# Patient Record
Sex: FEMALE | Race: BLACK OR AFRICAN AMERICAN | Employment: OTHER | URBAN - METROPOLITAN AREA
[De-identification: names, ages, dates, MRNs, and addresses within clinical notes are randomized per-mention and may not be internally consistent; named-entity substitution may affect disease eponyms.]

---

## 2017-11-30 ENCOUNTER — HOSPITAL ENCOUNTER (OUTPATIENT)
Dept: GENERAL RADIOLOGY | Age: 72
Discharge: HOME | End: 2017-11-30
Payer: MEDICARE

## 2017-11-30 DIAGNOSIS — M20.11 ACQUIRED HALLUX VALGUS OF RIGHT FOOT: ICD-10-CM

## 2017-11-30 PROCEDURE — 73630 X-RAY EXAM OF FOOT: CPT

## 2018-10-30 ENCOUNTER — HOSPITAL ENCOUNTER (OUTPATIENT)
Dept: PREADMISSION TESTING | Age: 73
Discharge: HOME | End: 2018-10-30
Payer: MEDICARE

## 2018-10-30 VITALS — BODY MASS INDEX: 35.3 KG/M2 | HEIGHT: 61 IN | WEIGHT: 187 LBS

## 2018-10-30 LAB
ALBUMIN SERPL-MCNC: 3.6 G/DL (ref 3.4–5)
ALBUMIN/GLOB SERPL: 1 {RATIO} (ref 0.8–1.7)
ALP SERPL-CCNC: 91 U/L (ref 45–117)
ALT SERPL-CCNC: 23 U/L (ref 13–56)
ANION GAP SERPL CALC-SCNC: 13 MMOL/L (ref 3–18)
AST SERPL-CCNC: 23 U/L (ref 15–37)
BILIRUB SERPL-MCNC: 0.3 MG/DL (ref 0.2–1)
BUN SERPL-MCNC: 9 MG/DL (ref 7–18)
BUN/CREAT SERPL: 9
CALCIUM SERPL-MCNC: 8.8 MG/DL (ref 8.5–10.1)
CHLORIDE SERPL-SCNC: 107 MMOL/L (ref 100–108)
CO2 SERPL-SCNC: 23 MMOL/L (ref 21–32)
CREAT SERPL-MCNC: 0.95 MG/DL (ref 0.6–1.3)
ERYTHROCYTE [DISTWIDTH] IN BLOOD BY AUTOMATED COUNT: 15.8 % (ref 11.6–14.5)
GLOBULIN SER CALC-MCNC: 3.6 G/DL (ref 2–4)
GLUCOSE SERPL-MCNC: 106 MG/DL (ref 74–99)
HCT VFR BLD AUTO: 36.9 % (ref 35–45)
HGB BLD-MCNC: 10.9 G/DL (ref 12–16)
MCH RBC QN AUTO: 27 PG (ref 24–34)
MCHC RBC AUTO-ENTMCNC: 29.5 G/DL (ref 31–37)
MCV RBC AUTO: 91.6 FL (ref 74–97)
PLATELET # BLD AUTO: 268 K/UL (ref 135–420)
PMV BLD AUTO: 11.3 FL (ref 9.2–11.8)
POTASSIUM SERPL-SCNC: 3.6 MMOL/L (ref 3.5–5.5)
PROT SERPL-MCNC: 7.2 G/DL (ref 6.4–8.2)
RBC # BLD AUTO: 4.03 M/UL (ref 4.2–5.3)
SODIUM SERPL-SCNC: 143 MMOL/L (ref 136–145)
WBC # BLD AUTO: 4.1 K/UL (ref 4.6–13.2)

## 2018-10-30 PROCEDURE — 85027 COMPLETE CBC AUTOMATED: CPT | Performed by: ORTHOPAEDIC SURGERY

## 2018-10-30 PROCEDURE — 80053 COMPREHEN METABOLIC PANEL: CPT | Performed by: ORTHOPAEDIC SURGERY

## 2018-10-30 RX ORDER — LIDOCAINE 50 MG/G
1 PATCH TOPICAL
COMMUNITY

## 2018-10-30 RX ORDER — ACETAMINOPHEN 500 MG
1000 TABLET ORAL
COMMUNITY

## 2018-10-30 RX ORDER — PREGABALIN 150 MG/1
150 CAPSULE ORAL 2 TIMES DAILY WITH MEALS
COMMUNITY

## 2018-10-30 RX ORDER — ASCORBIC ACID 500 MG
500 TABLET ORAL DAILY
COMMUNITY

## 2018-10-30 RX ORDER — DOCUSATE SODIUM 100 MG/1
100 CAPSULE, LIQUID FILLED ORAL
COMMUNITY

## 2018-10-30 RX ORDER — POTASSIUM CHLORIDE 20 MEQ/1
TABLET, EXTENDED RELEASE ORAL DAILY
COMMUNITY
End: 2020-02-05

## 2018-10-30 RX ORDER — OMEPRAZOLE 40 MG/1
40 CAPSULE, DELAYED RELEASE ORAL 2 TIMES DAILY
COMMUNITY

## 2018-10-30 RX ORDER — TRAMADOL HYDROCHLORIDE 50 MG/1
50 TABLET ORAL
COMMUNITY

## 2018-10-30 RX ORDER — PYRIDOXINE HCL (VITAMIN B6) 100 MG
100 TABLET ORAL DAILY
COMMUNITY
End: 2020-02-05

## 2018-10-30 NOTE — PERIOP NOTES
Patient states family physician is aware of upcoming procedure/surgeryChg wipes givenPatient instructed to bring cpap machine in on day of procedure/surgeryDenies family history of anesthesia complications

## 2018-11-02 PROBLEM — M81.0 OSTEOPOROSIS: Chronic | Status: ACTIVE | Noted: 2018-11-02

## 2018-11-02 PROBLEM — J44.9 COPD (CHRONIC OBSTRUCTIVE PULMONARY DISEASE) (HCC): Chronic | Status: ACTIVE | Noted: 2018-11-02

## 2018-11-02 PROBLEM — G56.01 CARPAL TUNNEL SYNDROME OF RIGHT WRIST: Chronic | Status: ACTIVE | Noted: 2018-11-02

## 2018-11-02 PROBLEM — E78.00 HIGH CHOLESTEROL: Chronic | Status: ACTIVE | Noted: 2018-11-02

## 2018-11-02 PROBLEM — D64.9 ANEMIA: Chronic | Status: ACTIVE | Noted: 2018-11-02

## 2018-11-02 PROBLEM — H40.9 GLAUCOMA: Chronic | Status: ACTIVE | Noted: 2018-11-02

## 2018-11-02 PROBLEM — K44.9 HIATAL HERNIA: Chronic | Status: ACTIVE | Noted: 2018-11-02

## 2018-11-02 PROBLEM — J45.909 ASTHMA: Chronic | Status: ACTIVE | Noted: 2018-11-02

## 2018-11-02 PROBLEM — K21.9 GERD (GASTROESOPHAGEAL REFLUX DISEASE): Chronic | Status: ACTIVE | Noted: 2018-11-02

## 2018-11-02 PROBLEM — K27.9 PEPTIC ULCER DISEASE: Chronic | Status: ACTIVE | Noted: 2018-11-02

## 2018-11-02 PROBLEM — G47.30 SLEEP APNEA: Chronic | Status: ACTIVE | Noted: 2018-11-02

## 2018-11-02 PROBLEM — J43.9 EMPHYSEMA LUNG (HCC): Chronic | Status: ACTIVE | Noted: 2018-11-02

## 2018-11-02 PROBLEM — I10 HTN (HYPERTENSION): Chronic | Status: ACTIVE | Noted: 2018-11-02

## 2018-11-02 NOTE — H&P
History and Physical 
 
 
 
Patient: Susan Olmos               Sex: female          DOA: (Not on file) YOB: 1945      Age:  74 y.o.        LOS:  LOS: 0 days HPI:  
 
Bret Linares is in for followup of her right-sided cervical DDD/HNP by cervical MRI/right-hand paresthesias/moderately severe carpal tunnel syndrome as seen by EMG/right shoulder pain with normal shoulder MRI. The patient is in for followup. Dr. Valerie Ledesma has not seen her in about a year. She has had cervical injections of cortisone and shoulder injections, both of which have not affected the complaints she is having in her hand. She still feels the paresthesias in the hand that wake her up at nighttime. She is also complaining of some pain at the base of her thumb with known significant pantrapezial arthritis. AP, lateral, and oblique views of the right hand were obtained and interpreted in the office and reveal significant pantrapezial DJD. Otherwise, no periosteal reaction, no medullary lesions, no osteopenia, well-aligned joint spaces, no chondrolysis, and no fractures. Past Medical History:  
Diagnosis Date  Adverse effect of anesthesia   
 hard to awaken  Arthritis  Asthma  Chronic obstructive pulmonary disease ([address]  Chronic pain   
 lower back, hips and knees  Enlarged thyroid  GERD (gastroesophageal reflux disease)  Hypertension   
 approx 2012  Ill-defined condition   
 glaucoma OU  PUD (peptic ulcer disease) 2009  
 bleeding ulcer  Unspecified sleep apnea   
 intermittent CPAP user Past Surgical History:  
Procedure Laterality Date  HX CATARACT REMOVAL  2011  [address] [facility]  HX LUMBAR LAMINECTOMY  2005  HX OOPHORECTOMY Left 1982  HX ORTHOPAEDIC Right 2007  
 foot and ankle surgery  TOTAL KNEE ARTHROPLASTY Left 2010 No family history on file. Social History Socioeconomic History  Marital status:  Spouse name: Not on file  Number of children: Not on file  Years of education: Not on file  Highest education level: Not on file Social Needs  Financial resource strain: Not on file  Food insecurity - worry: Not on file  Food insecurity - inability: Not on file  Transportation needs - medical: Not on file  Transportation needs - non-medical: Not on file Occupational History  Not on file Tobacco Use  Smoking status: Former Smoker Last attempt to quit: 2007 Years since quittin.8  Smokeless tobacco: Never Used Substance and Sexual Activity  Alcohol use: No  
 Drug use: No  
 Sexual activity: Not on file Other Topics Concern  Not on file Social History Narrative  Not on file Prior to Admission medications Medication Sig Start Date End Date Taking? Authorizing Provider  
pregabalin (LYRICA) 150 mg capsule Take  by mouth Before breakfast and dinner. Provider, Historical  
acetaminophen (TYLENOL EXTRA STRENGTH) 500 mg tablet Take 1,000 mg by mouth daily. Provider, Historical  
tiotropium-olodaterol (STIOLTO RESPIMAT) 2.5-2.5 mcg/actuation mist Take  by inhalation daily. Provider, Historical  
ascorbic acid, vitamin C, (VITAMIN C) 500 mg tablet Take 1,000 mg by mouth daily. Provider, Historical  
pyridoxine, vitamin B6, (VITAMIN B-6) 100 mg tablet Take 100 mg by mouth daily. Provider, Historical  
omeprazole (PRILOSEC) 40 mg capsule Take 40 mg by mouth daily. Provider, Historical  
cartilage/collagen II/hyaluron (MOVE FREE ULTRA PO) Take  by mouth daily. Provider, Historical  
potassium chloride (K-DUR, KLOR-CON) 20 mEq tablet Take  by mouth daily. Provider, Historical  
traMADol (ULTRAM) 50 mg tablet Take 50 mg by mouth every twelve (12) hours as needed for Pain.     Provider, Historical  
docusate sodium (COLACE) 100 mg capsule Take 100 mg by mouth two (2) times daily as needed for Constipation. Provider, Historical  
krill/om-3/dha/epa/phospho/ast (MEGARED OMEGA-3 KRILL OIL PO) Take 350 mg by mouth daily. Provider, Historical  
lidocaine (LIDODERM) 5 % 1 Patch by TransDERmal route every twenty-four (24) hours. Apply patch to the affected area for 12 hours a day and remove for 12 hours a day. Provider, Historical  
ferrous sulfate (IRON) 325 mg (65 mg iron) tablet Take 325 mg by mouth daily. Provider, Historical  
multivitamin (ONE A DAY) tablet Take 1 tablet by mouth daily. Provider, Historical  
b complex vitamins (B COMPLEX 1) tablet Take 1 tablet by mouth daily. Provider, Historical  
diclofenac (VOLTAREN) 1 % topical gel Apply 4 g to affected area four (4) times daily as needed. Provider, Historical  
albuterol (PROVENTIL HFA, VENTOLIN HFA, PROAIR HFA) 90 mcg/actuation inhaler Take 2 Puffs by inhalation every four (4) hours as needed for Wheezing. Provider, Historical  
tiotropium (SPIRIVA WITH HANDIHALER) 18 mcg inhalation capsule Take 1 capsule by inhalation daily. Pt instructed to bring inhaler on DOS. May use inhaler on DOS    Provider, Historical  
TRAVOPROST, BENZALKONIUM, (TRAVATAN OP) Apply 1 Drop to eye nightly. Provider, Historical  
brimonidine (ALPHAGAN P) 0.15 % ophthalmic solution Administer 1 Drop to both eyes two (2) times a day. Provider, Historical  
BETAXOLOL HCL (BETOPTIC S OP) Apply 1 Drop to eye two (2) times a day. Both eyes     Provider, Historical  
calcium-cholecalciferol, D3, (CALTRATE 600+D) tablet Take 1 tablet by mouth two (2) times a day. Provider, Historical  
telmisartan-hydrochlorothiazide (MICARDIS HCT) 40-12.5 mg per tablet Take 1 Tab by mouth daily. Provider, Historical  
atorvastatin (LIPITOR) 20 mg tablet Take 20 mg by mouth nightly. Provider, Historical  
pregabalin (LYRICA) 75 mg capsule Take 75 mg by mouth nightly. Provider, Historical  
 
 
Allergies Allergen Reactions  Diflucan [Fluconazole] Rash  Iodine Nausea and Vomiting Iodine on skin is ok per patient  Neosporin [Benzalkonium Chloride] Swelling Ointment made eyes swell  Nsaids (Non-Steroidal Anti-Inflammatory Drug) Other (comments) H/O bleeding ulcer  Shellfish Derived Nausea and Vomiting  Zocor [Simvastatin] Hives Review of Systems Pertinent positives include joint pain and joint stiffness. Pertinent negatives include chills, fever, weight change, fainting, loss of balance, muscle weakness, numbness/tingling, rheumatoid disease and swelling of feet. Physical Exam:  
  
There were no vitals taken for this visit. Physical Exam: 
Physical exam shows a healthy-appearing elderly  female. The right hand has decreased light-touch sensation in the first three digits. She has a positive Tinel, a positive Phalen's, and a  positive direct carpal compression test and pain at the first [facility] with a positive grind. Otherwise, examination shows that the patients  right wrist demonstrates no obvious swelling, ecchymosis, or wounds noted. The patient has normal motion in all six directions. The patient has a negative Finkelstein maneuver. The patient has good capillary refill. The patient has good  strength of the hand with normal thenar eminence tone and normal light-touch sensation at the tip of each digit. Assessment/Plan Principal Problem: 
  Carpal tunnel syndrome of right wrist (11/2/2018) Active Problems: 
  Emphysema lung ([facility] [address] (11/2/2018) HTN (hypertension) (11/2/2018) Glaucoma (11/2/2018) Asthma (11/2/2018) Anemia (11/2/2018) Osteoporosis (11/2/2018) Sleep apnea (11/2/2018) High cholesterol (11/2/2018) Hiatal hernia (11/2/2018) Peptic ulcer disease (11/2/2018) COPD (chronic obstructive pulmonary disease) ([address] (11/2/2018) GERD (gastroesophageal reflux disease) (11/2/2018) Dr. Leno Jc asked her to follow up again one week post right ECTR. She does have significant health issues, so we are going to try to get this sorted out to see if we can proceed with either a regional or a general anesthesia for her procedure. If they really do not improve either, then Dr. Leno Jc refer her to one of the local hand guys that does the open carpal tunnel release under local anesthesia. Dr. Leno Jc given her five Norco pills for postoperative pain use. Dr. Leno Jc talked to her about the risks of the surgery  including infection, pain and bleeding, and she would be willing to proceed if she can get cleared for surgery.

## 2018-11-07 ENCOUNTER — ANESTHESIA EVENT (OUTPATIENT)
Dept: SURGERY | Age: 73
End: 2018-11-07
Payer: MEDICARE

## 2018-11-07 RX ORDER — SODIUM CHLORIDE 0.9 % (FLUSH) 0.9 %
5-10 SYRINGE (ML) INJECTION AS NEEDED
Status: CANCELLED | OUTPATIENT
Start: 2018-11-07

## 2018-11-07 RX ORDER — DIPHENHYDRAMINE HCL 25 MG
25 CAPSULE ORAL
Status: CANCELLED | OUTPATIENT
Start: 2018-11-07

## 2018-11-07 RX ORDER — SODIUM CHLORIDE 0.9 % (FLUSH) 0.9 %
5-10 SYRINGE (ML) INJECTION EVERY 8 HOURS
Status: CANCELLED | OUTPATIENT
Start: 2018-11-07

## 2018-11-08 ENCOUNTER — HOSPITAL ENCOUNTER (OUTPATIENT)
Age: 73
Setting detail: OUTPATIENT SURGERY
Discharge: HOME | End: 2018-11-08
Attending: ORTHOPAEDIC SURGERY | Admitting: ORTHOPAEDIC SURGERY
Payer: MEDICARE

## 2018-11-08 ENCOUNTER — ANESTHESIA (OUTPATIENT)
Dept: SURGERY | Age: 73
End: 2018-11-08
Payer: MEDICARE

## 2018-11-08 VITALS
SYSTOLIC BLOOD PRESSURE: 107 MMHG | BODY MASS INDEX: 33.95 KG/M2 | DIASTOLIC BLOOD PRESSURE: 67 MMHG | WEIGHT: 184.5 LBS | TEMPERATURE: 97.1 F | HEART RATE: 84 BPM | RESPIRATION RATE: 16 BRPM | HEIGHT: 62 IN | OXYGEN SATURATION: 100 %

## 2018-11-08 PROCEDURE — 77030020782 HC GWN BAIR PAWS FLX 3M -B: Performed by: ORTHOPAEDIC SURGERY

## 2018-11-08 PROCEDURE — 77030009770 HC INSTRU CRPL SET CNMD -C: Performed by: ORTHOPAEDIC SURGERY

## 2018-11-08 PROCEDURE — 76210000016 HC OR PH I REC 1 TO 1.5 HR: Performed by: ORTHOPAEDIC SURGERY

## 2018-11-08 PROCEDURE — 77010033678 HC OXYGEN DAILY

## 2018-11-08 PROCEDURE — 77030032490 HC SLV COMPR SCD KNE COVD -B: Performed by: ORTHOPAEDIC SURGERY

## 2018-11-08 PROCEDURE — 74011000250 HC RX REV CODE- 250: Performed by: ORTHOPAEDIC SURGERY

## 2018-11-08 PROCEDURE — 76010000154 HC OR TIME FIRST 0.5 HR: Performed by: ORTHOPAEDIC SURGERY

## 2018-11-08 PROCEDURE — 74011250636 HC RX REV CODE- 250/636

## 2018-11-08 PROCEDURE — 76060000031 HC ANESTHESIA FIRST 0.5 HR: Performed by: ORTHOPAEDIC SURGERY

## 2018-11-08 PROCEDURE — 76210000021 HC REC RM PH II 0.5 TO 1 HR: Performed by: ORTHOPAEDIC SURGERY

## 2018-11-08 PROCEDURE — 74011250636 HC RX REV CODE- 250/636: Performed by: ORTHOPAEDIC SURGERY

## 2018-11-08 PROCEDURE — 77030000032 HC CUF TRNQT ZIMM -B: Performed by: ORTHOPAEDIC SURGERY

## 2018-11-08 RX ORDER — FENTANYL CITRATE 50 UG/ML
25 INJECTION, SOLUTION INTRAMUSCULAR; INTRAVENOUS AS NEEDED
Status: DISCONTINUED | OUTPATIENT
Start: 2018-11-08 | End: 2018-11-08 | Stop reason: HOSPADM

## 2018-11-08 RX ORDER — LIDOCAINE HYDROCHLORIDE 20 MG/ML
INJECTION, SOLUTION EPIDURAL; INFILTRATION; INTRACAUDAL; PERINEURAL AS NEEDED
Status: DISCONTINUED | OUTPATIENT
Start: 2018-11-08 | End: 2018-11-08 | Stop reason: HOSPADM

## 2018-11-08 RX ORDER — FLUMAZENIL 0.1 MG/ML
0.2 INJECTION INTRAVENOUS
Status: DISCONTINUED | OUTPATIENT
Start: 2018-11-08 | End: 2018-11-08 | Stop reason: HOSPADM

## 2018-11-08 RX ORDER — BUPIVACAINE HYDROCHLORIDE 2.5 MG/ML
INJECTION, SOLUTION EPIDURAL; INFILTRATION; INTRACAUDAL AS NEEDED
Status: DISCONTINUED | OUTPATIENT
Start: 2018-11-08 | End: 2018-11-08 | Stop reason: HOSPADM

## 2018-11-08 RX ORDER — SODIUM CHLORIDE, SODIUM LACTATE, POTASSIUM CHLORIDE, CALCIUM CHLORIDE 600; 310; 30; 20 MG/100ML; MG/100ML; MG/100ML; MG/100ML
1000 INJECTION, SOLUTION INTRAVENOUS CONTINUOUS
Status: DISCONTINUED | OUTPATIENT
Start: 2018-11-08 | End: 2018-11-08 | Stop reason: HOSPADM

## 2018-11-08 RX ORDER — HYDROMORPHONE HYDROCHLORIDE 2 MG/ML
0.5 INJECTION, SOLUTION INTRAMUSCULAR; INTRAVENOUS; SUBCUTANEOUS
Status: DISCONTINUED | OUTPATIENT
Start: 2018-11-08 | End: 2018-11-08 | Stop reason: HOSPADM

## 2018-11-08 RX ORDER — PROPOFOL 10 MG/ML
INJECTION, EMULSION INTRAVENOUS AS NEEDED
Status: DISCONTINUED | OUTPATIENT
Start: 2018-11-08 | End: 2018-11-08 | Stop reason: HOSPADM

## 2018-11-08 RX ORDER — ALBUTEROL SULFATE 0.83 MG/ML
2.5 SOLUTION RESPIRATORY (INHALATION) AS NEEDED
Status: DISCONTINUED | OUTPATIENT
Start: 2018-11-08 | End: 2018-11-08 | Stop reason: HOSPADM

## 2018-11-08 RX ORDER — MIDAZOLAM HYDROCHLORIDE 1 MG/ML
INJECTION, SOLUTION INTRAMUSCULAR; INTRAVENOUS AS NEEDED
Status: DISCONTINUED | OUTPATIENT
Start: 2018-11-08 | End: 2018-11-08 | Stop reason: HOSPADM

## 2018-11-08 RX ORDER — ONDANSETRON 2 MG/ML
INJECTION INTRAMUSCULAR; INTRAVENOUS AS NEEDED
Status: DISCONTINUED | OUTPATIENT
Start: 2018-11-08 | End: 2018-11-08 | Stop reason: HOSPADM

## 2018-11-08 RX ORDER — SODIUM CHLORIDE, SODIUM LACTATE, POTASSIUM CHLORIDE, CALCIUM CHLORIDE 600; 310; 30; 20 MG/100ML; MG/100ML; MG/100ML; MG/100ML
125 INJECTION, SOLUTION INTRAVENOUS CONTINUOUS
Status: DISCONTINUED | OUTPATIENT
Start: 2018-11-08 | End: 2018-11-08 | Stop reason: HOSPADM

## 2018-11-08 RX ORDER — PHENYLEPHRINE HCL IN 0.9% NACL 1 MG/10 ML
SYRINGE (ML) INTRAVENOUS AS NEEDED
Status: DISCONTINUED | OUTPATIENT
Start: 2018-11-08 | End: 2018-11-08 | Stop reason: HOSPADM

## 2018-11-08 RX ORDER — FENTANYL CITRATE 50 UG/ML
INJECTION, SOLUTION INTRAMUSCULAR; INTRAVENOUS AS NEEDED
Status: DISCONTINUED | OUTPATIENT
Start: 2018-11-08 | End: 2018-11-08 | Stop reason: HOSPADM

## 2018-11-08 RX ORDER — SODIUM CHLORIDE 0.9 % (FLUSH) 0.9 %
5-10 SYRINGE (ML) INJECTION AS NEEDED
Status: DISCONTINUED | OUTPATIENT
Start: 2018-11-08 | End: 2018-11-08 | Stop reason: HOSPADM

## 2018-11-08 RX ORDER — SODIUM CHLORIDE, SODIUM LACTATE, POTASSIUM CHLORIDE, CALCIUM CHLORIDE 600; 310; 30; 20 MG/100ML; MG/100ML; MG/100ML; MG/100ML
125 INJECTION, SOLUTION INTRAVENOUS CONTINUOUS
Status: DISCONTINUED | OUTPATIENT
Start: 2018-11-08 | End: 2018-11-08

## 2018-11-08 RX ORDER — NALOXONE HYDROCHLORIDE 0.4 MG/ML
0.2 INJECTION, SOLUTION INTRAMUSCULAR; INTRAVENOUS; SUBCUTANEOUS AS NEEDED
Status: DISCONTINUED | OUTPATIENT
Start: 2018-11-08 | End: 2018-11-08 | Stop reason: HOSPADM

## 2018-11-08 RX ORDER — DIPHENHYDRAMINE HYDROCHLORIDE 50 MG/ML
12.5 INJECTION, SOLUTION INTRAMUSCULAR; INTRAVENOUS
Status: DISCONTINUED | OUTPATIENT
Start: 2018-11-08 | End: 2018-11-08 | Stop reason: HOSPADM

## 2018-11-08 RX ADMIN — FENTANYL CITRATE 50 MCG: 50 INJECTION, SOLUTION INTRAMUSCULAR; INTRAVENOUS at 11:59

## 2018-11-08 RX ADMIN — Medication 100 MCG: at 11:49

## 2018-11-08 RX ADMIN — LIDOCAINE HYDROCHLORIDE 100 MG: 20 INJECTION, SOLUTION EPIDURAL; INFILTRATION; INTRACAUDAL; PERINEURAL at 11:44

## 2018-11-08 RX ADMIN — SODIUM CHLORIDE, SODIUM LACTATE, POTASSIUM CHLORIDE, AND CALCIUM CHLORIDE: 600; 310; 30; 20 INJECTION, SOLUTION INTRAVENOUS at 11:56

## 2018-11-08 RX ADMIN — SODIUM CHLORIDE, SODIUM LACTATE, POTASSIUM CHLORIDE, AND CALCIUM CHLORIDE 125 ML/HR: 600; 310; 30; 20 INJECTION, SOLUTION INTRAVENOUS at 09:41

## 2018-11-08 RX ADMIN — ONDANSETRON 4 MG: 2 INJECTION INTRAMUSCULAR; INTRAVENOUS at 11:49

## 2018-11-08 RX ADMIN — PROPOFOL 150 MG: 10 INJECTION, EMULSION INTRAVENOUS at 11:44

## 2018-11-08 RX ADMIN — MIDAZOLAM HYDROCHLORIDE 2 MG: 1 INJECTION, SOLUTION INTRAMUSCULAR; INTRAVENOUS at 11:40

## 2018-11-08 RX ADMIN — FENTANYL CITRATE 50 MCG: 50 INJECTION, SOLUTION INTRAMUSCULAR; INTRAVENOUS at 11:40

## 2018-11-08 NOTE — ANESTHESIA POSTPROCEDURE EVALUATION
Procedure(s): RIGHT ENDOSCOPIC CARPAL TUNNEL RELEASE, \"SPEC POP\". <BSHSIANPOST> Visit Vitals /57 Pulse 75 Temp 36.1 °C (97 °F) Resp 14 Ht 5' 2\" (1.575 m) Wt 83.7 kg (184 lb 8 oz) SpO2 99% BMI 33.75 kg/m² Post-Anesthesia Evaluation & Assessment No untreated/active PONV Post-operative hydration adequate. Adequate post-operative analgesia per PACU discharge criteria Mental status & level of consciousness: alert and oriented x 3 Respiratory status: patent unassisted airway No apparent anesthetic complications requiring additional post-anesthetic care Patient has met all discharge requirements.  
 
 
 
 
 
Michel Spears MD

## 2018-11-08 NOTE — OP NOTES
Single Incision Endoscopic Carpal Tunnel Release    Patient: Reg Brambila MRN: [id number]  CSN: [id number]   YOB: 1945  Age: 74 y.o. Sex: female      Date of Surgery:11/8/2018    PREOPERATIVE DIAGNOSES:  RIGHT CARPAL TUNNEL SYNDROME      POSTOPERATIVE DIAGNOSES:  RIGHT CARPAL TUNNEL SYNDROME    PROCEDURE: right Single Incision Endoscpoic Carpal Tunnel Release    SURGEON: Jose Luis Karimi MD    SPECIMEN TO PATHOLOGY:  * No specimens in log *    ESTIMATED BLOOD LOSS: none    FINDINGS:  Same     TOURNIQUET  TIME:   Approximately 5 minutes at 057 Hg     COMPLICATIONS:  None     ANESTHESIA:  General    INDICATIONS:  A 74y.o. year old female with known carpal tunnel syndrome documented by clinical exam and nerve conduction studies. The patient now presents for a ECTR. PROCEDURE: The patient was brought into the operating theater and after adequate prepping and draping of the surgical wrist and hand the limb was then exsanguinated with an Esmarch bandage and the tourniquet was inflated. .  A 1 cm transverse incision was placed at the volar flexion wrist crease just ulnar to the palmaris longus. The incision was deepened to the antebrachial fascia which was released the length of the wound and proximally for an additional 1 cm. Using the [facility] carpal tunnel release kit, the carpal tunnel was dilated in line with the fourth metacarpal to the distal extent of the transverse carpal ligament. The cannula was then inserted and kept on some proximal radial deviation with the wrist in slight extension, and it was passed all the way to the cardinal line of Castillo. The scope was then inserted with the wrist in slight extension and the undersurface of the transverse carpal ligament was seen easily from proximal to the distal fatty/adipose tissue at the end of the transverse carpal ligament.   Using the [facility] on the [employer] and using the endoscope for visualization, the transverse carpal ligament was transected in two passes with good release of the ligament outside of view of the cannula. The nerve was never in harms way. The cannula was then withdrawn and placed back in the carpal tunnel with none of the prerelease tension. The wound was then closed with Mastisol on either side and then Steri-Strips were used to re-oppose the skin without the use of stitches. The skin and cut ends of the transverse carpal ligament were anesthetized with 4 to 5 mL of 0.25% Marcaine without epinephrine. This was dressed with two 4 x 4s and an Ace wrap. The tourniquet was deflated and removed, and the patient taken to the recovery room awake and in stable condition. All instrument, sponge and needle counts were correct.     Disha Thomson MD  11/8/2018

## 2018-11-08 NOTE — ANESTHESIA PREPROCEDURE EVALUATION
Anesthetic History No history of anesthetic complications Review of Systems / Medical History Patient summary reviewed, nursing notes reviewed and pertinent labs reviewed Pulmonary COPD Sleep apnea: CPAP Asthma Comments: O2 2/3 lpm 
 Neuro/Psych Within defined limits Cardiovascular Hypertension GI/Hepatic/Renal 
  
GERD: well controlled PUD Endo/Other Arthritis Comments: Goiter Other Findings Physical Exam 
 
Airway Mallampati: III 
TM Distance: 4 - 6 cm Neck ROM: normal range of motion Mouth opening: Normal 
 
 Cardiovascular Regular rate and rhythm,  S1 and S2 normal,  no murmur, click, rub, or gallop Dental 
 
Dentition: Lower partial plate and Upper partial plate Pulmonary Decreased breath sounds: bilateral 
 
 
 
 
 Abdominal 
GI exam deferred Other Findings Anesthetic Plan ASA: 3 Anesthesia type: general 
 
 
 
 
Induction: Intravenous Anesthetic plan and risks discussed with: Patient

## 2018-11-08 NOTE — DISCHARGE INSTRUCTIONS
Nicole Gottlieb III, MD Darrelyn Hau, PA-C    Upper Extremity Surgery  Discharge Instructions      Please take the time to review the following instructions before you leave the hospital and use them as guidelines during your recovery from surgery. If you have any questions you may contact my office at [phone]. Wound Care/Dressing Changes:    []   You may remove the bulky dressing two days after surgery. Once you remove this, no dressing is necessary if there is no drainage. [x]   You may change your dressing as needed. Beginning the 2 days after you are discharged from the hospital you should change your dressing daily. A big, bulky dressing isnt necessary as long as there is any drainage from the incisions. You can put a band-aid or a piece of gauze over each incision and wear an ACE bandage as needed for comfort and swelling. []   Dont remove your dressing or get them wet.  It isnt necessary to apply antibiotic ointment to your incisions. Sutures will be removed at your one week post-op visit. Staples (if you have them) are removed in two weeks. If you have steri-strips over your incision they will start to peel off in 7-10 days as you get them wet. They dont need to be removed prior to that. When they begin to peel off, you may remove them. They should all be removed by 14 days from your surgery. Showering/Bathing:    [x]   You may shower 2 days after your surgery. Your dressing may be removed for showering. You may get your incisions wet in the shower. Dont vigorously scrub your incisions. Apply a clean, dry dressing after you have dried your incisions. Do not take a bath or get into a swimming pool or Jacuzzi until the incisions are completely healed. This may take about 14 days. Do not soak your incision under water. Sling:    [x]   You are not required to wear your sling and should do so only as needed for comfort.  You have no restrictions with regards to the movement of your shoulder. Please push to achieve full range of motion as soon as possible. You may resume your normal daily activities immediately and return to work as soon as you feel appropriate.    []   Keep your arm in the immobilizer at all times except when showering and changing your clothes. When showering or changing, keep your arm at your side. Dont move it away from your body. []   Keep your arm in the immobilizer at all times except when showering, changing your clothes and doing the exercises shown to you by Dr. Axel Melo or your physical therapist prior to your discharge from the hospital.  Keep your arm at your side when changing your clothes and showering. Dont move it away from your body. Ice/Elevation    Continue ice consistently for 48 hours after surgery. After 48 hours, you should ice your shoulder 3 times per day, for 20 minutes at a time for the next 5 days. After one week from surgery, you may use ice as needed for pain and swelling. Diet:    You may advance to your regular diet as tolerated. Medication:    1. You will be given a prescription for pain medication when you are discharged from the hospital.  Take the medication as needed according to the directions on the prescription bottle. Possible side effects of the medication include dizziness, headache, nausea, vomiting, constipation and urinary retention. If you experience any of these side effects call the office so that we can assist you in relieving them. Discontinue the use of the pain medication if you develop itching, rash, shortness of breath or difficulties swallowing. If these symptoms become severe or arent relieved by discontinuing the medication you should seek immediate medical attention. Refills of pain medication are authorized during office hours only (8 AM-5PM Mon. thru Fri.).    2. If you were prescribed Percoset/oxycodone or Dilaudid/hydromorphone you must have a written prescription. These medications legally cannot be called in to the pharmacy. 3. You may take over the counter Ibuprofen/Advil/Aleve between dosages of your pain medication if needed. Do not take Tylenol in addition to your pain medication as most of the pain medication already contains Tylenol. Do not exceed 3000mg of Tylenol per day. Ex: (hydrocodone 5/325g= 325mg of Tylenol)  4. You may resume the medication you were taking prior to surgery. Pain medication may change the effects of any antidepressant medication you are taking. If you have any questions about possible interactions between your regular medications and the pain medication you should consult the physician who prescribes your regular medications. Follow Up Appointment:  If you are unsure of your follow-up appointment date and time, please call [phone]. Please let our  know you are scheduling a post-op appointment. Most appointments should be between 7-14 days after your surgery. Physical Therapy:    []    If you already have a therapy appointment, please be sure to attend your sessions as scheduled. []   Physical Therapy will be discussed with you at your first follow-up appointment with Dr. Sophia Castillo. You dont need to begin physical therapy prior to that.    []  Begin physical therapy with your Home Health Physical Therapy. This will be set up         for your before you leave the hospital.    [x]  You do not require Physical Therapy. Important Signs and Symptoms:    If any of the following signs and symptoms occurs, you should contact Dr. Sophia Castillo office. Please be advised if a problem arises which you feel requires immediate medical attention or you are unable to contact Dr. Sophia Castillo office you should seek immediate medical attention. Signs and symptoms to watch for include:    1.  A sudden increase in swelling and/or redness or warmth at the area your surgery was performed which isnt relieved by rest, ice, and elevation. 2. Oral temperature greater than 101 degrees for 12 hours or more which isnt relieved by an increase in fluid intake and taking two Tylenol every 4-6 hours. 3. Excessive drainage from your incisions, or drainage which hasnt stopped by 72 hours after your surgery. 4. Fever, chills, shortness of breath, chest pain, nausea, vomiting or other signs and symptoms which are of concern to you. DISCHARGE SUMMARY from Nurse    PATIENT INSTRUCTIONS:    After general anesthesia or intravenous sedation, for 24 hours or while taking prescription Narcotics:  · Limit your activities  · Do not drive and operate hazardous machinery  · Do not make important personal or business decisions  · Do  not drink alcoholic beverages  · If you have not urinated within 8 hours after discharge, please contact your surgeon on call. Report the following to your surgeon:  · Excessive pain, swelling, redness or odor of or around the surgical area  · Temperature over 100.5  · Nausea and vomiting lasting longer than 4 hours or if unable to take medications  · Any signs of decreased circulation or nerve impairment to extremity: change in color, persistent  numbness, tingling, coldness or increase pain  · Any questions    What to do at Home:  Recommended activity: No heavy lifting, pushing, pulling with the operative side. If you experience any of the following symptoms fever, chills, uncontrolled pain, uncontrolled vomiting, excessive drainage from incision, please follow up with Dr. Jim Whitt. *  Please give a list of your current medications to your Primary Care Provider. *  Please update this list whenever your medications are discontinued, doses are      changed, or new medications (including over-the-counter products) are added. *  Please carry medication information at all times in case of emergency situations.     These are general instructions for a healthy lifestyle:    No smoking/ No tobacco products/ Avoid exposure to second hand smoke  Surgeon General's Warning:  Quitting smoking now greatly reduces serious risk to your health. Obesity, smoking, and sedentary lifestyle greatly increases your risk for illness    A healthy diet, regular physical exercise & weight monitoring are important for maintaining a healthy lifestyle    You may be retaining fluid if you have a history of heart failure or if you experience any of the following symptoms:  Weight gain of 3 pounds or more overnight or 5 pounds in a week, increased swelling in our hands or feet or shortness of breath while lying flat in bed. Please call your doctor as soon as you notice any of these symptoms; do not wait until your next office visit. Recognize signs and symptoms of STROKE:    F-face looks uneven    A-arms unable to move or move unevenly    S-speech slurred or non-existent    T-time-call 911 as soon as signs and symptoms begin-DO NOT go       Back to bed or wait to see if you get better-TIME IS BRAIN. Warning Signs of HEART ATTACK     Call 911 if you have these symptoms:   Chest discomfort. Most heart attacks involve discomfort in the center of the chest that lasts more than a few minutes, or that goes away and comes back. It can feel like uncomfortable pressure, squeezing, fullness, or pain.  Discomfort in other areas of the upper body. Symptoms can include pain or discomfort in one or both arms, the back, neck, jaw, or stomach.  Shortness of breath with or without chest discomfort.  Other signs may include breaking out in a cold sweat, nausea, or lightheadedness. Don't wait more than five minutes to call 911 - MINUTES MATTER! Fast action can save your life. Calling 911 is almost always the fastest way to get lifesaving treatment. Emergency Medical Services staff can begin treatment when they arrive -- up to an hour sooner than if someone gets to the hospital by car.      The discharge information has been reviewed with the patient and caregiver. The patient and caregiver verbalized understanding. Discharge medications reviewed with the patient and caregiver and appropriate educational materials and side effects teaching were provided.   ___________________________________________________________________________________________________________________________________

## 2018-11-08 NOTE — INTERVAL H&P NOTE
H&P Update: 
Norma Arnold was seen and examined. History and physical has been reviewed. The patient has been examined. There have been no significant clinical changes since the completion of the originally dated History and Physical. 
Patient identified by surgeon; surgical site was confirmed by patient and surgeon.  
 
Signed By: Sohan Rey MD   
 November 8, 2018 10:01 AM

## 2018-11-08 NOTE — PERIOP NOTES
TRANSFER - OUT REPORT: 
 
Verbal report given to Carol MELGAR on Sarah Shields  being transferred to Phase II for routine progression of care Report consisted of patients Situation, Background, Assessment and  
Recommendations(SBAR). Information from the following report(s) SBAR was reviewed with the receiving nurse. Lines:  
Peripheral IV 11/08/18 Left Hand (Active) Site Assessment Clean, dry, & intact 11/8/2018 12:30 PM  
Phlebitis Assessment 0 11/8/2018 12:30 PM  
Infiltration Assessment 0 11/8/2018 12:30 PM  
Dressing Status Clean, dry, & intact 11/8/2018 12:30 PM  
Dressing Type Transparent;Tape 11/8/2018 12:30 PM  
Hub Color/Line Status Pink;Patent; Infusing 11/8/2018 12:30 PM  
 
Visit Vitals /65 Pulse 81 Temp 97 °F (36.1 °C) Resp 16 Ht 5' 2\" (1.575 m) Wt 83.7 kg (184 lb 8 oz) SpO2 97% BMI 33.75 kg/m² Intake/Output Summary (Last 24 hours) at 11/8/2018 1309 Last data filed at 11/8/2018 1308 Gross per 24 hour Intake 1600 ml Output 5 ml Net 1595 ml Opportunity for questions and clarification was provided. Patient transported with: 
 O2 @ 2 liters Tech Savanna Bowman RN

## 2018-11-08 NOTE — PERIOP NOTES
Reviewed PTA medication list with patient/caregiver and patient/caregiver denies any additional medications. Patient admits to having a responsible adult care for them for at least 24 hours after surgery. 
  
Permission granted by patient for a dual skin assessment. Dual skin assessment completed by Nash Jacques RN and Fatou Leary RN.

## 2020-02-04 ENCOUNTER — HOSPITAL ENCOUNTER (OUTPATIENT)
Dept: PREADMISSION TESTING | Age: 75
Discharge: HOME | End: 2020-02-04
Attending: ORTHOPAEDIC SURGERY
Payer: MEDICARE

## 2020-02-04 DIAGNOSIS — M75.41 IMPINGEMENT SYNDROME OF RIGHT SHOULDER: ICD-10-CM

## 2020-02-04 DIAGNOSIS — M75.121 COMPLETE TEAR OF RIGHT ROTATOR CUFF: ICD-10-CM

## 2020-02-04 DIAGNOSIS — Z01.812 BLOOD TESTS PRIOR TO TREATMENT OR PROCEDURE: ICD-10-CM

## 2020-02-04 DIAGNOSIS — Z01.818 OTHER SPECIFIED PRE-OPERATIVE EXAMINATION: ICD-10-CM

## 2020-02-04 LAB
ALBUMIN SERPL-MCNC: 3.6 G/DL (ref 3.4–5)
ALBUMIN/GLOB SERPL: 0.9 {RATIO} (ref 0.8–1.7)
ALP SERPL-CCNC: 96 U/L (ref 45–117)
ALT SERPL-CCNC: 22 U/L (ref 13–56)
ANION GAP SERPL CALC-SCNC: 1 MMOL/L (ref 3–18)
AST SERPL-CCNC: 19 U/L (ref 10–38)
ATRIAL RATE: 90 BPM
BILIRUB SERPL-MCNC: 0.3 MG/DL (ref 0.2–1)
BUN SERPL-MCNC: 10 MG/DL (ref 7–18)
BUN/CREAT SERPL: 11 (ref 12–20)
CALCIUM SERPL-MCNC: 9.3 MG/DL (ref 8.5–10.1)
CALCULATED P AXIS, ECG09: 63 DEGREES
CALCULATED R AXIS, ECG10: 43 DEGREES
CALCULATED T AXIS, ECG11: 32 DEGREES
CHLORIDE SERPL-SCNC: 111 MMOL/L (ref 100–111)
CO2 SERPL-SCNC: 33 MMOL/L (ref 21–32)
CREAT SERPL-MCNC: 0.91 MG/DL (ref 0.6–1.3)
DIAGNOSIS, 93000: NORMAL
ERYTHROCYTE [DISTWIDTH] IN BLOOD BY AUTOMATED COUNT: 15.7 % (ref 11.6–14.5)
GLOBULIN SER CALC-MCNC: 3.9 G/DL (ref 2–4)
GLUCOSE SERPL-MCNC: 113 MG/DL (ref 74–99)
HCT VFR BLD AUTO: 35.6 % (ref 35–45)
HGB BLD-MCNC: 11.1 G/DL (ref 12–16)
MCH RBC QN AUTO: 26.2 PG (ref 24–34)
MCHC RBC AUTO-ENTMCNC: 31.2 G/DL (ref 31–37)
MCV RBC AUTO: 84.2 FL (ref 74–97)
P-R INTERVAL, ECG05: 158 MS
PLATELET # BLD AUTO: 345 K/UL (ref 135–420)
PMV BLD AUTO: 10.3 FL (ref 9.2–11.8)
POTASSIUM SERPL-SCNC: 3.6 MMOL/L (ref 3.5–5.5)
PROT SERPL-MCNC: 7.5 G/DL (ref 6.4–8.2)
Q-T INTERVAL, ECG07: 360 MS
QRS DURATION, ECG06: 72 MS
QTC CALCULATION (BEZET), ECG08: 440 MS
RBC # BLD AUTO: 4.23 M/UL (ref 4.2–5.3)
SODIUM SERPL-SCNC: 145 MMOL/L (ref 136–145)
VENTRICULAR RATE, ECG03: 90 BPM
WBC # BLD AUTO: 4.9 K/UL (ref 4.6–13.2)

## 2020-02-04 PROCEDURE — 85027 COMPLETE CBC AUTOMATED: CPT

## 2020-02-04 PROCEDURE — 93005 ELECTROCARDIOGRAM TRACING: CPT

## 2020-02-04 PROCEDURE — 80053 COMPREHEN METABOLIC PANEL: CPT

## 2020-02-04 PROCEDURE — 36415 COLL VENOUS BLD VENIPUNCTURE: CPT

## 2020-02-18 PROBLEM — M75.111 INCOMPLETE TEAR OF RIGHT ROTATOR CUFF: Chronic | Status: ACTIVE | Noted: 2020-02-18

## 2020-02-18 NOTE — H&P
History and Physical        Patient: Bishop Pacheco               Sex: female          DOA: (Not on file)         YOB: 1945      Age:  66 y.o.        LOS:  LOS: 0 days        HPI:     Efrain Bar is in for followup of her right shoulder bursitis/AC DJD/mild glenohumeral DJD/high-grade rotator cuff tear, as seen by recent MRI. Dr. Yasmine Patel has seen her previously for bilateral third trigger fingers/resolved and bilateral right greater than left moderate first [facility] DJD. The MRI of her shoulder does show a high-grade partial supraspinatus rotator cuff tear. It is more towards the posterior part of the supraspinatus. Past Medical History:   Diagnosis Date    Adverse effect of anesthesia     hard to awaken    Anemia     Arthritis     Asthma     Chronic obstructive pulmonary disease (HCC)     Chronic pain     lower back, hips and knees    Enlarged thyroid     GERD (gastroesophageal reflux disease)     Hypertension     approx 2012    Ill-defined condition     glaucoma OU    PUD (peptic ulcer disease) 2009    bleeding ulcer    Unspecified sleep apnea     CPAP user, Patient instructed to bring CPAP machine on DOS       Past Surgical History:   Procedure Laterality Date    HX CATARACT REMOVAL      OU    HX HYSTERECTOMY  1982    TVH    HX LUMBAR LAMINECTOMY  2005    HX OOPHORECTOMY Left     HX ORTHOPAEDIC Right 2007    foot and ankle surgery    TOTAL KNEE ARTHROPLASTY Left 2010       No family history on file.     Social History     Socioeconomic History    Marital status:      Spouse name: Not on file    Number of children: Not on file    Years of education: Not on file    Highest education level: Not on file   Tobacco Use    Smoking status: Former Smoker     Last attempt to quit:      Years since quittin.1    Smokeless tobacco: Never Used   Substance and Sexual Activity    Alcohol use: No    Drug use: Never    Sexual activity: Not Currently Prior to Admission medications    Medication Sig Start Date End Date Taking? Authorizing Provider   telmisartan (MICARDIS) 40 mg tablet Take 40 mg by mouth daily. Provider, Historical   meclizine (ANTIVERT) 25 mg tablet Take 25 mg by mouth every six (6) hours as needed for Dizziness. Provider, Historical   amoxicillin (AMOXIL) 500 mg capsule Take 2,000 mg by mouth. Prior to dental procedures    Provider, Historical   carboxymethylcellulose sodium (REFRESH OP) Apply 1 Drop to eye four (4) times daily as needed. Both eyes    Provider, Historical   white petrolatum-mineral oil (REFRESH P.M.) 57.3-42.5 % oint ophthalmic ointment Administer  to both eyes nightly as needed. Provider, Historical   OTHER,NON-FORMULARY, CBD oil 1.5 dropper full oral route two times daily    Provider, Historical   pregabalin (LYRICA) 150 mg capsule Take 150 mg by mouth two (2) times daily (with meals). With breakfast and lunch    Provider, Historical   acetaminophen (TYLENOL EXTRA STRENGTH) 500 mg tablet Take 1,000 mg by mouth every six (6) hours as needed. Provider, Historical   tiotropium-olodaterol (STIOLTO RESPIMAT) 2.5-2.5 mcg/actuation mist Take 2 Puffs by inhalation daily. Provider, Historical   ascorbic acid, vitamin C, (VITAMIN C) 500 mg tablet Take 500 mg by mouth daily. Provider, Historical   omeprazole (PRILOSEC) 40 mg capsule Take 40 mg by mouth two (2) times a day. Provider, Historical   cartilage/collagen II/hyaluron (MOVE FREE ULTRA PO) Take 1 Cap by mouth daily. Provider, Historical   traMADol (ULTRAM) 50 mg tablet Take 50 mg by mouth every six (6) hours as needed for Pain. Provider, Historical   docusate sodium (COLACE) 100 mg capsule Take 100 mg by mouth two (2) times daily as needed for Constipation. Provider, Historical   krill/om-3/dha/epa/phospho/ast (MEGARED OMEGA-3 KRILL OIL PO) Take 500 mg by mouth daily.     Provider, Historical   lidocaine (LIDODERM) 5 % 1 Patch by TransDERmal route every twelve (12) hours as needed. Apply patch to the affected area for 12 hours a day and remove for 12 hours a day. Provider, Historical   ferrous sulfate (IRON) 325 mg (65 mg iron) tablet Take 325 mg by mouth daily. Provider, Historical   multivitamin (ONE A DAY) tablet Take 1 tablet by mouth daily. Provider, Historical   b complex vitamins (B COMPLEX 1) tablet Take 1 tablet by mouth daily. Provider, Historical   diclofenac (VOLTAREN) 1 % topical gel Apply 4 g to affected area four (4) times daily as needed. Provider, Historical   albuterol (PROVENTIL HFA, VENTOLIN HFA, PROAIR HFA) 90 mcg/actuation inhaler Take 2 Puffs by inhalation every four (4) hours as needed for Wheezing. Provider, Historical   TRAVOPROST, BENZALKONIUM, (TRAVATAN OP) Apply 1 Drop to eye nightly. Both eyes    Provider, Historical   brimonidine (ALPHAGAN P) 0.15 % ophthalmic solution Administer 1 Drop to both eyes two (2) times a day. Provider, Historical   BETAXOLOL HCL (BETOPTIC S OP) Apply 1 Drop to eye two (2) times a day. Both eyes     Provider, Historical   calcium-cholecalciferol, D3, (CALTRATE 600+D) tablet Take 1 tablet by mouth two (2) times a day. Provider, Historical   atorvastatin (LIPITOR) 20 mg tablet Take 20 mg by mouth nightly. Provider, Historical   pregabalin (LYRICA) 75 mg capsule Take 75 mg by mouth nightly. Provider, Historical       Allergies   Allergen Reactions    Diflucan [Fluconazole] Rash    Iodine Nausea and Vomiting     Iodine on skin is ok per patient    Neosporin [Benzalkonium Chloride] Swelling     Ointment made eyes swell    Nsaids (Non-Steroidal Anti-Inflammatory Drug) Other (comments)     H/O bleeding ulcer    Shellfish Derived Nausea and Vomiting    Zocor [Simvastatin] Hives       Review of Systems  A comprehensive review of systems was negative except for that written in the History of Present Illness.       Physical Exam:      There were no vitals taken for this visit.    Physical Exam:  Examination of the patient's right shoulder shows impingement with total elevation of 180 degrees today. She has pain with cross-over adduction of the right shoulder. She is weak, about 4+/5 with external rotation, as well as supraspinatus testing. Otherwise, the right shoulder has no pain at the [facility] joint or the biceps groove. The skin has no swelling, ecchymosis, or wounds. In the supine position, the patient has no abduction or external rotation apprehension sign and has a negative relocation maneuver. The patient has a negative sulcus sign. There is no pain to palpation anywhere in the shoulder. The patient has good capillary refill, excellent  strength of the hand, and normal light-touch sensation of the hand. Assessment/Plan     Principal Problem:    Incomplete tear of right rotator cuff (2/18/2020)    Active Problems:    DDD (degenerative disc disease), lumbar (11/10/2014)      Emphysema lung ([facility] [address] (11/2/2018)      HTN (hypertension) (11/2/2018)      Glaucoma (11/2/2018)      Asthma (11/2/2018)      Osteoporosis (11/2/2018)      Sleep apnea (11/2/2018)      High cholesterol (11/2/2018)      Hiatal hernia (11/2/2018)      Peptic ulcer disease (11/2/2018)      COPD (chronic obstructive pulmonary disease) ([facility] [address] (11/2/2018)      GERD (gastroesophageal reflux disease) (11/2/2018)      Dr. Good Chambers asked her to follow up again one week postop. He scheduled her for a right shoulder arthroscopy, decompression, distal clavicle excision and arthroscopic rotator cuff repair. He has talked to her about the risks including infection, pain and bleeding, and she is willing to proceed. She has already tried cortisone injections, physical therapy and is not improving and that wakes her up nightly. Dr. Good Chambers does not think she has significant enough arthritis to progress to total shoulder arthroplasty. The patient was issued #30 Roxicodone and five Keflex pills for perioperative use.  We have issued a sling to be used postsurgery. We will see her again 10 days postop. Dr. Jackson Pepper will likely start her on his three week protocol on therapy for her shoulder. She understands all these risks and is willing to proceed.

## 2020-02-19 RX ORDER — SODIUM CHLORIDE 0.9 % (FLUSH) 0.9 %
5-40 SYRINGE (ML) INJECTION AS NEEDED
Status: CANCELLED | OUTPATIENT
Start: 2020-02-19

## 2020-02-19 RX ORDER — SODIUM CHLORIDE 0.9 % (FLUSH) 0.9 %
5-40 SYRINGE (ML) INJECTION EVERY 8 HOURS
Status: CANCELLED | OUTPATIENT
Start: 2020-02-19

## 2020-02-20 ENCOUNTER — ANESTHESIA EVENT (OUTPATIENT)
Dept: SURGERY | Age: 75
End: 2020-02-20
Payer: MEDICARE

## 2020-02-20 ENCOUNTER — ANESTHESIA (OUTPATIENT)
Dept: SURGERY | Age: 75
End: 2020-02-20
Payer: MEDICARE

## 2020-02-20 ENCOUNTER — HOSPITAL ENCOUNTER (OUTPATIENT)
Age: 75
Discharge: HOME | End: 2020-02-20
Attending: ORTHOPAEDIC SURGERY | Admitting: ORTHOPAEDIC SURGERY
Payer: MEDICARE

## 2020-02-20 VITALS
SYSTOLIC BLOOD PRESSURE: 119 MMHG | BODY MASS INDEX: 35.09 KG/M2 | HEIGHT: 62 IN | OXYGEN SATURATION: 98 % | RESPIRATION RATE: 20 BRPM | HEART RATE: 72 BPM | WEIGHT: 190.7 LBS | TEMPERATURE: 98.6 F | DIASTOLIC BLOOD PRESSURE: 79 MMHG

## 2020-02-20 PROBLEM — M75.110 INCOMPLETE ROTATOR CUFF TEAR: Status: ACTIVE | Noted: 2020-02-20

## 2020-02-20 PROCEDURE — 74011000250 HC RX REV CODE- 250: Performed by: SPECIALIST

## 2020-02-20 PROCEDURE — 74011250636 HC RX REV CODE- 250/636: Performed by: PHYSICIAN ASSISTANT

## 2020-02-20 PROCEDURE — 77030016678 HC BUR SHV4 S&N -B: Performed by: ORTHOPAEDIC SURGERY

## 2020-02-20 PROCEDURE — C1713 ANCHOR/SCREW BN/BN,TIS/BN: HCPCS | Performed by: ORTHOPAEDIC SURGERY

## 2020-02-20 PROCEDURE — 77030002922 HC SUT FBRWRE ARTH -B: Performed by: ORTHOPAEDIC SURGERY

## 2020-02-20 PROCEDURE — 77030006884 HC BLD SHV INCIS S&N -B: Performed by: ORTHOPAEDIC SURGERY

## 2020-02-20 PROCEDURE — 74011250636 HC RX REV CODE- 250/636: Performed by: SPECIALIST

## 2020-02-20 PROCEDURE — 77030003598 HC NDL MULT/FIRE ARTH -C: Performed by: ORTHOPAEDIC SURGERY

## 2020-02-20 PROCEDURE — 74011000250 HC RX REV CODE- 250: Performed by: ORTHOPAEDIC SURGERY

## 2020-02-20 PROCEDURE — 77030020782 HC GWN BAIR PAWS FLX 3M -B: Performed by: ORTHOPAEDIC SURGERY

## 2020-02-20 PROCEDURE — 76010000149 HC OR TIME 1 TO 1.5 HR: Performed by: ORTHOPAEDIC SURGERY

## 2020-02-20 PROCEDURE — 77030002933 HC SUT MCRYL J&J -A: Performed by: ORTHOPAEDIC SURGERY

## 2020-02-20 PROCEDURE — 76210000016 HC OR PH I REC 1 TO 1.5 HR: Performed by: ORTHOPAEDIC SURGERY

## 2020-02-20 PROCEDURE — 74011250636 HC RX REV CODE- 250/636: Performed by: ORTHOPAEDIC SURGERY

## 2020-02-20 PROCEDURE — 76210000023 HC REC RM PH II 2 TO 2.5 HR: Performed by: ORTHOPAEDIC SURGERY

## 2020-02-20 PROCEDURE — 77030034478 HC TU IRR ARTHRO PT ARTH -B: Performed by: ORTHOPAEDIC SURGERY

## 2020-02-20 PROCEDURE — 77030022877 HC TU IRR ARTHRO PMP ARTH -B: Performed by: ORTHOPAEDIC SURGERY

## 2020-02-20 PROCEDURE — 77030040361 HC SLV COMPR DVT MDII -B: Performed by: ORTHOPAEDIC SURGERY

## 2020-02-20 PROCEDURE — 77030018835 HC SOL IRR LR ICUM -A: Performed by: ORTHOPAEDIC SURGERY

## 2020-02-20 PROCEDURE — 77030010427: Performed by: ORTHOPAEDIC SURGERY

## 2020-02-20 PROCEDURE — 77030010801: Performed by: ORTHOPAEDIC SURGERY

## 2020-02-20 PROCEDURE — 76060000033 HC ANESTHESIA 1 TO 1.5 HR: Performed by: ORTHOPAEDIC SURGERY

## 2020-02-20 PROCEDURE — 74011250637 HC RX REV CODE- 250/637: Performed by: SPECIALIST

## 2020-02-20 DEVICE — MULTIFIX S-ULTRA 5.5MM KNOTLESS ANCHOR
Type: IMPLANTABLE DEVICE | Site: SHOULDER | Status: FUNCTIONAL
Brand: MULTIFIX

## 2020-02-20 RX ORDER — CEFAZOLIN SODIUM 2 G/50ML
2 SOLUTION INTRAVENOUS ONCE
Status: COMPLETED | OUTPATIENT
Start: 2020-02-20 | End: 2020-02-20

## 2020-02-20 RX ORDER — ONDANSETRON 2 MG/ML
INJECTION INTRAMUSCULAR; INTRAVENOUS AS NEEDED
Status: DISCONTINUED | OUTPATIENT
Start: 2020-02-20 | End: 2020-02-20 | Stop reason: HOSPADM

## 2020-02-20 RX ORDER — SODIUM CHLORIDE 0.9 % (FLUSH) 0.9 %
5-40 SYRINGE (ML) INJECTION AS NEEDED
Status: DISCONTINUED | OUTPATIENT
Start: 2020-02-20 | End: 2020-02-20 | Stop reason: HOSPADM

## 2020-02-20 RX ORDER — SODIUM CHLORIDE, SODIUM LACTATE, POTASSIUM CHLORIDE, CALCIUM CHLORIDE 600; 310; 30; 20 MG/100ML; MG/100ML; MG/100ML; MG/100ML
50 INJECTION, SOLUTION INTRAVENOUS CONTINUOUS
Status: DISCONTINUED | OUTPATIENT
Start: 2020-02-20 | End: 2020-02-20 | Stop reason: HOSPADM

## 2020-02-20 RX ORDER — NALOXONE HYDROCHLORIDE 0.4 MG/ML
0.1 INJECTION, SOLUTION INTRAMUSCULAR; INTRAVENOUS; SUBCUTANEOUS
Status: DISCONTINUED | OUTPATIENT
Start: 2020-02-20 | End: 2020-02-20 | Stop reason: HOSPADM

## 2020-02-20 RX ORDER — DEXAMETHASONE SODIUM PHOSPHATE 4 MG/ML
INJECTION, SOLUTION INTRA-ARTICULAR; INTRALESIONAL; INTRAMUSCULAR; INTRAVENOUS; SOFT TISSUE AS NEEDED
Status: DISCONTINUED | OUTPATIENT
Start: 2020-02-20 | End: 2020-02-20 | Stop reason: HOSPADM

## 2020-02-20 RX ORDER — ONDANSETRON 2 MG/ML
4 INJECTION INTRAMUSCULAR; INTRAVENOUS ONCE
Status: COMPLETED | OUTPATIENT
Start: 2020-02-20 | End: 2020-02-20

## 2020-02-20 RX ORDER — SODIUM CHLORIDE, SODIUM LACTATE, POTASSIUM CHLORIDE, CALCIUM CHLORIDE 600; 310; 30; 20 MG/100ML; MG/100ML; MG/100ML; MG/100ML
125 INJECTION, SOLUTION INTRAVENOUS CONTINUOUS
Status: DISCONTINUED | OUTPATIENT
Start: 2020-02-20 | End: 2020-02-20

## 2020-02-20 RX ORDER — FENTANYL CITRATE 50 UG/ML
25 INJECTION, SOLUTION INTRAMUSCULAR; INTRAVENOUS
Status: DISCONTINUED | OUTPATIENT
Start: 2020-02-20 | End: 2020-02-20 | Stop reason: HOSPADM

## 2020-02-20 RX ORDER — PROPOFOL 10 MG/ML
INJECTION, EMULSION INTRAVENOUS AS NEEDED
Status: DISCONTINUED | OUTPATIENT
Start: 2020-02-20 | End: 2020-02-20 | Stop reason: HOSPADM

## 2020-02-20 RX ORDER — HYDROMORPHONE HYDROCHLORIDE 2 MG/ML
0.5 INJECTION, SOLUTION INTRAMUSCULAR; INTRAVENOUS; SUBCUTANEOUS
Status: COMPLETED | OUTPATIENT
Start: 2020-02-20 | End: 2020-02-20

## 2020-02-20 RX ORDER — SODIUM CHLORIDE, SODIUM LACTATE, POTASSIUM CHLORIDE, CALCIUM CHLORIDE 600; 310; 30; 20 MG/100ML; MG/100ML; MG/100ML; MG/100ML
125 INJECTION, SOLUTION INTRAVENOUS CONTINUOUS
Status: DISCONTINUED | OUTPATIENT
Start: 2020-02-20 | End: 2020-02-20 | Stop reason: HOSPADM

## 2020-02-20 RX ORDER — ACETAMINOPHEN 500 MG
1000 TABLET ORAL ONCE
Status: COMPLETED | OUTPATIENT
Start: 2020-02-20 | End: 2020-02-20

## 2020-02-20 RX ORDER — FENTANYL CITRATE 50 UG/ML
INJECTION, SOLUTION INTRAMUSCULAR; INTRAVENOUS AS NEEDED
Status: DISCONTINUED | OUTPATIENT
Start: 2020-02-20 | End: 2020-02-20 | Stop reason: HOSPADM

## 2020-02-20 RX ORDER — SODIUM CHLORIDE 0.9 % (FLUSH) 0.9 %
5-40 SYRINGE (ML) INJECTION EVERY 8 HOURS
Status: DISCONTINUED | OUTPATIENT
Start: 2020-02-20 | End: 2020-02-20 | Stop reason: HOSPADM

## 2020-02-20 RX ORDER — LIDOCAINE HYDROCHLORIDE 20 MG/ML
INJECTION, SOLUTION EPIDURAL; INFILTRATION; INTRACAUDAL; PERINEURAL AS NEEDED
Status: DISCONTINUED | OUTPATIENT
Start: 2020-02-20 | End: 2020-02-20 | Stop reason: HOSPADM

## 2020-02-20 RX ADMIN — FENTANYL CITRATE 25 MCG: 50 INJECTION, SOLUTION INTRAMUSCULAR; INTRAVENOUS at 09:12

## 2020-02-20 RX ADMIN — HYDROMORPHONE HYDROCHLORIDE 0.5 MG: 2 INJECTION, SOLUTION INTRAMUSCULAR; INTRAVENOUS; SUBCUTANEOUS at 10:30

## 2020-02-20 RX ADMIN — LIDOCAINE HYDROCHLORIDE 60 MG: 20 INJECTION, SOLUTION EPIDURAL; INFILTRATION; INTRACAUDAL; PERINEURAL at 08:50

## 2020-02-20 RX ADMIN — HYDROMORPHONE HYDROCHLORIDE 0.5 MG: 2 INJECTION, SOLUTION INTRAMUSCULAR; INTRAVENOUS; SUBCUTANEOUS at 10:40

## 2020-02-20 RX ADMIN — DEXAMETHASONE SODIUM PHOSPHATE 4 MG: 4 INJECTION, SOLUTION INTRAMUSCULAR; INTRAVENOUS at 09:03

## 2020-02-20 RX ADMIN — FENTANYL CITRATE 25 MCG: 50 INJECTION, SOLUTION INTRAMUSCULAR; INTRAVENOUS at 09:46

## 2020-02-20 RX ADMIN — HYDROMORPHONE HYDROCHLORIDE 0.5 MG: 2 INJECTION, SOLUTION INTRAMUSCULAR; INTRAVENOUS; SUBCUTANEOUS at 10:10

## 2020-02-20 RX ADMIN — FENTANYL CITRATE 25 MCG: 50 INJECTION, SOLUTION INTRAMUSCULAR; INTRAVENOUS at 08:50

## 2020-02-20 RX ADMIN — ACETAMINOPHEN 1000 MG: 500 TABLET ORAL at 07:58

## 2020-02-20 RX ADMIN — ONDANSETRON HYDROCHLORIDE 4 MG: 2 INJECTION INTRAMUSCULAR; INTRAVENOUS at 09:03

## 2020-02-20 RX ADMIN — HYDROMORPHONE HYDROCHLORIDE 0.5 MG: 2 INJECTION, SOLUTION INTRAMUSCULAR; INTRAVENOUS; SUBCUTANEOUS at 10:20

## 2020-02-20 RX ADMIN — PROPOFOL 150 MG: 10 INJECTION, EMULSION INTRAVENOUS at 08:50

## 2020-02-20 RX ADMIN — FENTANYL CITRATE 25 MCG: 50 INJECTION, SOLUTION INTRAMUSCULAR; INTRAVENOUS at 09:41

## 2020-02-20 RX ADMIN — CEFAZOLIN SODIUM 2 G: 2 SOLUTION INTRAVENOUS at 08:42

## 2020-02-20 RX ADMIN — SODIUM CHLORIDE, SODIUM LACTATE, POTASSIUM CHLORIDE, AND CALCIUM CHLORIDE 125 ML/HR: 600; 310; 30; 20 INJECTION, SOLUTION INTRAVENOUS at 06:36

## 2020-02-20 RX ADMIN — ONDANSETRON 4 MG: 2 INJECTION INTRAMUSCULAR; INTRAVENOUS at 11:48

## 2020-02-20 NOTE — DISCHARGE INSTRUCTIONS
Upper Extremity Surgery Discharge Instructions    Please take the time to review the following instructions before you leave the hospital and use them as guidelines during your recovery from surgery. If you have any questions, you may contact my office at [phone]. Fariba Romo / Betty Saroj may change your dressings as needed. Beginning 2 days after you are discharged from the hospital, you should change your dressings daily. A big, bulky dressing isnt necessary as long as there is no drainage from the incisions. You can put a band-aid or piece of gauze over each incision. It isnt necessary to apply antibiotic ointment to your incisions. If you have steri-strips over you incision, they will start to peel off in 7-10 days as you get them wet. They dont need to be removed before that. When they begin to peel off, you may remove them. Showering / Bathing    You may shower 2 days after your surgery. Your dressing may be removed for showering. You may get your incisions wet in the shower. Do not vigorously scrub your incisions. Apply a clean, dry dressing after you have dried your incisions. Do not take a bath or get into a swimming pool or [employer] until you follow up with Dr. Tania Montoya. Do not soak your incisions under water. Sling    Keep your arm in the immobilizer/sling at all times except when showering and changing your clothes. When showering or changing, keep your arm at your side. Do not move it away from your body. Ice and Elevation    Continue ice consistently for 48 hours after surgery. After 48 hours, you should ice 3 times per day for 20 minutes at a time for the next 5 days. After 1 week from surgery, you may use ice as needed for pain. Diet    You may advance your regular diet as tolerated. Increase your clear liquid intake for the next 2-3 days. Medications    1.  You will be given prescriptions for pain medication, inflammation, and nausea when you are discharged from the hospital. Please use the medications as prescribed. Pain medications may cause constipation - Colace or Milk of Magnesia may be used as needed. Other possible side effects of pain medications are dizziness, headache, nausea, vomiting, and urinary retention. Discontinue the pain medication if you develop itching, rash, shortness of breath, or difficulties swallowing. If these symptoms become severe or arent relieved by discontinuing the medication, you should seek immediate medical attention. 2. Refills of pain medication are authorized during office hours only (8AM - 5PM Monday through Friday)  3. If you were prescribed Percocet /Oxycodone, Dilaudid/Hydromorphone or Demerol/Meperidine you must have a written prescription. These medications cannot be leagally called into the pharmacy. 4. Do not take Tylenol/Acetaminophen in addition to your pain medication, as most pain medications already contain this. Do not exceed 3000mg of Tylenol/Acetaminophen per day. 5. You may resume the medication you were taking prior to your surgery. Pain medication may change the effects of any antidepressant medication you may be taking. If you have any questions about possible interactions between your regular medication and the pain medication, you should consult the physician who prescribes your regular medications. Physical Therapy:    Physical Therapy will be discussed with you at your first follow-up appointment with Dr. Yasmine Patel. You do not need to start therapy prior to that. Follow up appointment:    Please follow up at your scheduled appointment 7-10 days from the day of your surgery. If you do not already have an appointment, please call our office at [phone]. for your follow up appointment. Important Signs and Symptoms:    If any of the following signs and symptoms occurs, you should contact Dr. Yasmine Patel' office.  Please be advised if a problem arises which you feel requires immediate medical attention or you are unable to contact Dr. Aura Chaidez' office, you should seek immediate medical attention. Signs and symptoms to watch for include:    1. A sudden increase in swelling and/or redness or warmth at the area of your surgery which isnt relieved by rest, ice, and elevation. 2. Oral temperature greater than 101degrees for 12 hours or more which isnt relieved by an increase in fluid intake and taking two Tylenol every 4-6 hours. 3. Excessive drainage from your incisions, or drainage which hasnt stopped by 72 hours after your surgery. 4. Fever, chills, shortness of breath, chest pain, nausea, vomiting or other signs and symptoms which are of concern to you. DISCHARGE SUMMARY from Nurse    PATIENT INSTRUCTIONS:    After general anesthesia or intravenous sedation, for 24 hours or while taking prescription Narcotics:  · Limit your activities  · Do not drive and operate hazardous machinery  · Do not make important personal or business decisions  · Do  not drink alcoholic beverages  · If you have not urinated within 8 hours after discharge, please contact your surgeon on call. Report the following to your surgeon:  · Excessive pain, swelling, redness or odor of or around the surgical area  · Temperature over 100.5  · Nausea and vomiting lasting longer than 4 hours or if unable to take medications  · Any signs of decreased circulation or nerve impairment to extremity: change in color, persistent  numbness, tingling, coldness or increase pain  · Any questions    What to do at Home:  Recommended activity: Activity as tolerated and no driving for today,     If you experience any of the following symptoms above, please follow up with Dr Aura Chaidez. *  Please give a list of your current medications to your Primary Care Provider. *  Please update this list whenever your medications are discontinued, doses are      changed, or new medications (including over-the-counter products) are added.     * Please carry medication information at all times in case of emergency situations. These are general instructions for a healthy lifestyle:    No smoking/ No tobacco products/ Avoid exposure to second hand smoke  Surgeon General's Warning:  Quitting smoking now greatly reduces serious risk to your health. Obesity, smoking, and sedentary lifestyle greatly increases your risk for illness    A healthy diet, regular physical exercise & weight monitoring are important for maintaining a healthy lifestyle    You may be retaining fluid if you have a history of heart failure or if you experience any of the following symptoms:  Weight gain of 3 pounds or more overnight or 5 pounds in a week, increased swelling in our hands or feet or shortness of breath while lying flat in bed. Please call your doctor as soon as you notice any of these symptoms; do not wait until your next office visit. Patient armband removed and shredded      The discharge information has been reviewed with the patient and caregiver. The patient and caregiver verbalized understanding. Discharge medications reviewed with the patient and caregiver and appropriate educational materials and side effects teaching were provided.   ___________________________________________________________________________________________________________________________________

## 2020-02-20 NOTE — ANESTHESIA PREPROCEDURE EVALUATION
Relevant Problems   No relevant active problems       Anesthetic History   No history of anesthetic complications            Review of Systems / Medical History  Patient summary reviewed, nursing notes reviewed and pertinent labs reviewed    Pulmonary    COPD (home O2): moderate    Sleep apnea: CPAP      Pertinent negatives: No asthma     Neuro/Psych   Within defined limits           Cardiovascular    Hypertension              Exercise tolerance: <4 METS     GI/Hepatic/Renal     GERD        Pertinent negatives: No PUD   Endo/Other        Arthritis  Pertinent negatives: No hypothyroidism   Other Findings              Physical Exam    Airway  Mallampati: III  TM Distance: 4 - 6 cm  Neck ROM: normal range of motion   Mouth opening: Normal     Cardiovascular               Dental    Dentition: Edentulous     Pulmonary                 Abdominal         Other Findings            Anesthetic Plan    ASA: 3  Anesthesia type: general          Induction: Intravenous  Anesthetic plan and risks discussed with: Patient and Son / Daughter      Patient and daughter aware of risk of postoperative ventilation secondary to COPD. Tolerated GA/LMA not too long ago.

## 2020-02-20 NOTE — OP NOTES
Patient: Shannan Riley MRN: [id number]  CSN: [id number]   YOB: 1945  Age: 66 y.o. Sex: female      Date of Surgery:2/20/2020    PREOPERATIVE DIAGNOSES  1. Right shoulder subacromial bursitis/impingement syndrome. 2. 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 3. Right shoulder complete rotator cuff tear. POSTOPERATIVE DIAGNOSES:  1. Right shoulder subacromial bursitis/impingement syndrome. 2. 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 3. Right shoulder complete rotator cuff tear. PROCEDURES PERFORMED  1. Surgical arthroscopy, Right shoulder, with arthroscopic subacromial  decompression/bursectomy. 2. Right shoulder arthroscopic distal clavicle excision. 3. Right shoulder arthroscopic rotator cuff repair. 4. Right shoulder suprascapular nerve block by Dr. Dave Godinez. IMPLANTS:   Implant Name Type Inv. Item Serial No.  Lot No. LRB No. Used Action   ANCHOR SUT ULTRA PEEK KL 5.5MM -- Vince Goldberg [id number]  ANCHOR SUT ULTRA PEEK KL 5.5MM -- [facility] [id number] 1 Implanted       SURGEON: Jason Iraheta MD    FIRST ASSISTANT: Fan Velazquez PA-C    SECOND ASSISTANT: Physician Assistant: Emilie Burk PA-C    ANESTHESIA: General.    COMPLICATIONS: None. ESTIMATED BLOOD LOSS: Minimal.    FINDINGS: Same    SPECIMENS REMOVED: none    INDICATIONS: A 66 y.o. BLACK OR   female with known Right  shoulder bursitis, AC DJD and rotator cuff tear as seen by MRI. The patient has failed all conservative interventions including medications, physical therapy, relative rest  and injections. DESCRIPTION OF PROCEDURE: The patient was brought to the operating theater  and after adequate general anesthesia, the patient was put on the OR table and  underwent general anesthesia and put in the beach chair position.  The Right  shoulder was then examined and then prepped and draped in the typical  sterile fashion. The patient had full range of motion with no instability. Standard anterior, posterior, and midlateral portals were all anesthetized  with 0.25% Marcaine with 1:200,000 epinephrine. The glenohumeral joint was  instilled with 15 mL of the same mixture. The subacromial space was then injected with the same  Mixture/amount. The spinal needle was placed from superior into the spinoglenoid notch. Approximately 10cc's of the Marcaine mixture was placed in this are effectively blocking the suprascapular nerve. The scope was placed posteriorly into the glenohumeral joint and through the rotator interval, an anterior portal was created and a small metal cannula inserted over the Wissinger lisa. Findings at this time showed  Normal articular surfaces. There was a supraspinatus complete rotator cuff  tear that was approximately 1.5cm in width and 1cm retracted. The subscap appeared normal.   The biceps anchor, as well as the intra and extra articular biceps was normal.  The  scope was withdrawn and placed in the subacromial space. A midlateral  portal was created and the gray cannula inserted. The soft tissue on the  underside of the acromion was then resected with the Incisor Plus blade the soft tissue was resected and then using the Helicut bur a lateral and posterior trough were created outlining the decompression area. The bur was then placed posteriorly and the scope in the midlateral portal, and a type 1 acromion was created by the cutting block method, removing about 8 mm of anterior acromion. The scope was returned to the posterior portal and the bur in the midlateral portal, and the inferior distal 1.5 cm clavicle was  resected. The bur was then placed anteriorly, and the remaining superior  distal 1.5 cm clavicle was resected.  The large-bore threaded cannula was  then placed in the midlateral portal, and the rotator cuff footprint was  denuded with Incisor Plus / Becca Sheth bur down to good bleeding surface. Small holes were made in the rotator cuff footprint for marrow venting. A #2 Ultratape was loaded on the Scorpion by Arthrex and passed through a full-thickness bite of the posterior part of the torn rotator cuff. The second lead of the Ultratape was placed in the suture passer and passed just 1 cm from the first pass and brought out the midlateral portal.   The 5.5 Multifix by Desire Parker was used and the two leads of #2 Ultratape was passed through the anchor and the awl was used to make the hole at the appropriate place on the upper aspect of the greater tuberosity. The anchor was then deployed into the hole keeping the stitches taught. This brought the rotator cuff in good apposition. There was no stress in the repair with the arm at the side and there was no stress of the repair. The space was then decompressed. Each of the portals were closed with 1  horizontal 4-0 Monocryl. They were steri-stripped, had 4 x 4's placed, and  tape. The patient was put in a sling and returned to the recovery room  awake, in stable condition. All instrument, sponge and needle counts were  Correct. A physician assistant was used during the surgery which contributes to better patient outcomes by decreasing operating room time and decreasing the amount of anesthesia the patient had to undergo. The physician assistant helped with positioning and draping of the patient, retraction of soft tissues as necessary so as not to traumatize the tissues. During several parts of the procedure the PA used the arthroscope to help with visualization while I performed the actual surgery. The PA also used the shaver under my direction with either the Helicut rita or the suction/shaver attachment to help complete the procedure.   During the repair the camera was held by the PA as well as alternating with holding the arm in the correct position so the suture anchors and stitches could be performed by me.   The physician assistant instilled local anesthetic which decreased the need for post operative narcotics. During closure the physician assistant was able to close the portals with an inverted 3-0 Monocryl ensuring a water tight closure and decreasing the risk of deep infection. The steristrips and the dressing were applied by the PA to ensure a tight closure. This helps contribute to a lower risk of   infection.       Art Acosta MD  2/20/2020

## 2020-02-20 NOTE — INTERVAL H&P NOTE
H&P Update: 
Sydnie Barrios was seen and examined. History and physical has been reviewed. The patient has been examined. There have been no significant clinical changes since the completion of the originally dated History and Physical. 
Patient identified by surgeon; surgical site was confirmed by patient and surgeon.

## 2020-02-20 NOTE — ANESTHESIA POSTPROCEDURE EVALUATION
Post-Anesthesia Evaluation and Assessment    Cardiovascular Function/Vital Signs  Visit Vitals  /83   Pulse 75   Temp 36.4 °C (97.6 °F)   Resp 15   Ht 5' 2\" (1.575 m)   Wt 86.5 kg (190 lb 11.2 oz)   SpO2 96%   BMI 34.88 kg/m²       Patient is status post Procedure(s):  SURGICAL ARTHROSCOPY RIGHT SHOULDER, SUBACROMIAL DECOMPRESSION, DISTAL CLAVICLE EXCISION, ROTATOR CUFF REPAIR,. Nausea/Vomiting: Controlled. Postoperative hydration reviewed and adequate. Pain:  Pain Scale 1: FLACC (02/20/20 1045)  Pain Intensity 1: 0 (02/20/20 1045)   Managed. Neurological Status:   Neuro (WDL): Within Defined Limits (02/20/20 1045)   At baseline. Mental Status and Level of Consciousness: Baseline and appropriate for discharge. Pulmonary Status:   O2 Device: Non-rebreather mask (02/20/20 0958)   Adequate oxygenation and airway patent. Complications related to anesthesia: None    Post-anesthesia assessment completed. No concerns. Patient has met all discharge requirements.     Signed By: Jazlyn Do MD    February 20, 2020

## (undated) DEVICE — KNEE ARTHROSCOPY III-LF: Brand: MEDLINE INDUSTRIES, INC.

## (undated) DEVICE — 4.5 MM INCISOR PLUS STRAIGHT                                    BLADES, POWER/EP-1, VIOLET, PACKAGED                                    6 PER BOX, STERILE

## (undated) DEVICE — CTS RELIEF KIT, CARPAL TUNNEL SYNDROME RELIEF KIT: Brand: CTS RELIEF KIT

## (undated) DEVICE — NEEDLE SUT PASS FOR ROT CUF LABRAL REP MULTFI SCORPION

## (undated) DEVICE — TUBING PMP L8FT LNG W/ CONN FOR AR-6400 REDEUCE

## (undated) DEVICE — PAD,ABDOMINAL,5"X9",ST,LF,25/BX: Brand: MEDLINE INDUSTRIES, INC.

## (undated) DEVICE — NEEDLE HYPO 25GA L1.5IN BVL ORIENTED ECLIPSE

## (undated) DEVICE — MASTISOL ADHESIVE LIQ 2/3ML

## (undated) DEVICE — ULTRATAPE 2MM BLUE 38 PKG OF 6

## (undated) DEVICE — SOLUTION IRRIG 3000ML LAC R FLX CONT

## (undated) DEVICE — 4.5 MM HELICUT STRAIGHT BURRS,                                    POWER/EP-1, SLATE, 5000 MAXIMUM RPM,                                    PACKAGED 6 PER BOX, STERILE: Brand: DYONICS HELICUT

## (undated) DEVICE — (D)PREP SKN CHLRAPRP APPL 26ML -- CONVERT TO ITEM 371833

## (undated) DEVICE — SHOULDER CANNULA SET WITHOUT FENESTRATIONS, 5.5 MM (I.D.) X 70 MM: Brand: CONMED

## (undated) DEVICE — STERILE POLYISOPRENE POWDER-FREE SURGICAL GLOVES: Brand: PROTEXIS

## (undated) DEVICE — CANNULA THREADED FLEX 8.0 X 72MM: Brand: CLEAR-TRAC

## (undated) DEVICE — REM POLYHESIVE ADULT PATIENT RETURN ELECTRODE: Brand: VALLEYLAB

## (undated) DEVICE — NEEDLE HYPO 25GA L1.5IN BLU POLYPR HUB S STL REG BVL STR

## (undated) DEVICE — TUBE IRRIG L8IN LNG PT W/ CONN FOR PMP SYS REDEUCE

## (undated) DEVICE — SUTURE FIBERWIRE SZ 2 W/ TAPERED NEEDLE BLUE L38IN NONABSORB BLU L26.5MM 1/2 CIRCLE AR7200

## (undated) DEVICE — PACK PROCEDURE SURG EXTREMITY CUST

## (undated) DEVICE — KENDALL SCD EXPRESS SLEEVES, KNEE LENGTH, MEDIUM: Brand: KENDALL SCD

## (undated) DEVICE — (D)STRIP SKN CLSR 0.5X4IN WHT --

## (undated) DEVICE — GOWN,AURORA,NONRNF,XL,30/CS: Brand: MEDLINE

## (undated) DEVICE — 3M™ STERI-DRAPE™ U-DRAPE 1015: Brand: STERI-DRAPE™

## (undated) DEVICE — DRAPE,U/ SHT,SPLIT,PLAS,STERIL: Brand: MEDLINE

## (undated) DEVICE — STERILE POLYISOPRENE POWDER-FREE SURGICAL GLOVES WITH EMOLLIENT COATING: Brand: PROTEXIS

## (undated) DEVICE — DRAPE,SHOULDER,BEACH CHAIR,STERILE: Brand: MEDLINE

## (undated) DEVICE — GARMENT,MEDLINE,DVT,INT,CALF,MED, GEN2: Brand: MEDLINE

## (undated) DEVICE — ZIMMER® STERILE DISPOSABLE TOURNIQUET CUFF WITH PROTECTIVE SLEEVE AND PLC, SINGLE PORT, SINGLE BLADDER, 18 IN. (46 CM)

## (undated) DEVICE — BANDAGE COMPR W4INXL5YD ELAS CLP CLSR

## (undated) DEVICE — STRIP,CLOSURE,WOUND,MEDI-STRIP,1/2X4: Brand: MEDLINE

## (undated) DEVICE — SUT MONOCRYL PLUS UD 3-0 --